# Patient Record
Sex: FEMALE | Race: WHITE | URBAN - METROPOLITAN AREA
[De-identification: names, ages, dates, MRNs, and addresses within clinical notes are randomized per-mention and may not be internally consistent; named-entity substitution may affect disease eponyms.]

---

## 2018-09-25 ENCOUNTER — OUTPATIENT (OUTPATIENT)
Dept: OUTPATIENT SERVICES | Facility: HOSPITAL | Age: 73
LOS: 1 days | Discharge: HOME | End: 2018-09-25

## 2018-09-26 DIAGNOSIS — Z78.0 ASYMPTOMATIC MENOPAUSAL STATE: ICD-10-CM

## 2018-09-26 DIAGNOSIS — Z87.310 PERSONAL HISTORY OF (HEALED) OSTEOPOROSIS FRACTURE: ICD-10-CM

## 2018-09-26 DIAGNOSIS — Z09 ENCOUNTER FOR FOLLOW-UP EXAMINATION AFTER COMPLETED TREATMENT FOR CONDITIONS OTHER THAN MALIGNANT NEOPLASM: ICD-10-CM

## 2018-09-26 DIAGNOSIS — Z82.62 FAMILY HISTORY OF OSTEOPOROSIS: ICD-10-CM

## 2018-09-26 DIAGNOSIS — M81.0 AGE-RELATED OSTEOPOROSIS WITHOUT CURRENT PATHOLOGICAL FRACTURE: ICD-10-CM

## 2019-11-23 ENCOUNTER — EMERGENCY (EMERGENCY)
Facility: HOSPITAL | Age: 74
LOS: 0 days | Discharge: HOME | End: 2019-11-23
Admitting: EMERGENCY MEDICINE
Payer: MEDICARE

## 2019-11-23 VITALS
TEMPERATURE: 98 F | RESPIRATION RATE: 19 BRPM | SYSTOLIC BLOOD PRESSURE: 146 MMHG | OXYGEN SATURATION: 98 % | HEART RATE: 64 BPM | DIASTOLIC BLOOD PRESSURE: 66 MMHG
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
## 2019-11-23 DIAGNOSIS — S69.90XA UNSPECIFIED INJURY OF UNSPECIFIED WRIST, HAND AND FINGER(S), INITIAL ENCOUNTER: ICD-10-CM
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
## 2019-11-23 DIAGNOSIS — S62.627A DISPLACED FRACTURE OF MIDDLE PHALANX OF LEFT LITTLE FINGER, INITIAL ENCOUNTER FOR CLOSED FRACTURE: ICD-10-CM
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
## 2019-11-23 PROCEDURE — 73130 X-RAY EXAM OF HAND: CPT | Mod: 26,LT

## 2019-11-23 PROCEDURE — 29125 APPL SHORT ARM SPLINT STATIC: CPT

## 2019-11-23 PROCEDURE — 99284 EMERGENCY DEPT VISIT MOD MDM: CPT | Mod: 25

## 2019-11-23 NOTE — ED PROVIDER NOTE - PHYSICAL EXAMINATION
VITAL SIGNS: I have reviewed nursing notes and confirm.  CONSTITUTIONAL: Well-developed; well-nourished; in no acute distress.  SKIN: Skin exam is warm and dry, no acute rash.  HEAD: Normocephalic; atraumatic.  EYES: PERRL, EOM intact; conjunctiva and sclera clear.  ENT: No nasal discharge; airway clear. TMs clear.  NECK: Supple; non tender.  CARD: S1, S2 normal; no murmurs, gallops, or rubs. Regular rate and rhythm.  RESP: No wheezes, rales or rhonchi.  ABD: Normal bowel sounds; soft; non-distended; non-tender; no hepatosplenomegaly.  EXT: Normal ROM.   MSK: Tenderness to distal 5th digit of L hand with ecchymosis to L 5th digit and dorsal aspect of L hand at 4th and 5th metacarpal. FROM of hand.   LYMPH: No acute cervical adenopathy.  NEURO: Alert, oriented. Grossly unremarkable. No focal deficits.  PSYCH: Cooperative, appropriate. VITAL SIGNS: I have reviewed nursing notes and confirm.  CONSTITUTIONAL: Well-developed; well-nourished; in no acute distress.  SKIN: See below. Remainder of skin exam is warm and dry, no acute rash.  HEAD: Normocephalic; atraumatic.  EYES: PERRL, EOM intact; conjunctiva and sclera clear.  ENT: No nasal discharge; airway clear.   NECK: Supple; non tender.  CARD: S1, S2 normal; no murmurs, gallops, or rubs. Regular rate and rhythm.  RESP: No wheezes, rales or rhonchi.  EXT: Normal ROM.   MSK: Tenderness to distal 5th digit of L hand with ecchymosis to L 5th digit and dorsal aspect of L hand at 4th and 5th metacarpal. FROM of hand.   NEURO: Alert, oriented. Grossly unremarkable. No focal deficits.  PSYCH: Cooperative, appropriate.

## 2019-11-23 NOTE — ED PROVIDER NOTE - NS ED MD DISPO DISCHARGE CCDA
Patient/Caregiver provided printed discharge information.
Regular rate and rhythm, Heart sounds S1 S2 present, no murmurs, rubs or gallops

## 2019-11-23 NOTE — ED PROVIDER NOTE - OBJECTIVE STATEMENT
75 y/o F no PMH p/w fx to L hand. Pt states on Thursday she tripped and fell and the corner of the stairs hit her 4th and 5th digits displacing her L pinky. States she pushed it back into place. XR done demonstrating fx. Applied tape around 4th and 5th digit. No numbness, tingling, weakness. No decreased ROM. Denies pain. Reports ecchymosis to site. No head injury or LOC at the time of injury. No anticoagulation. 73 y/o F no PMH p/w fx to L hand. Pt states on Thursday she tripped and fell and the corner of the stairs hit her 4th and 5th digits displacing her L pinky. States she pushed it back into place. XR done as outpatient demonstrating fx. Applied tape around 4th and 5th digit prior to arrival. No numbness, tingling, weakness. No decreased ROM. Denies pain. Reports ecchymosis to site. No head injury or LOC at the time of injury. No anticoagulation.

## 2019-11-23 NOTE — ED PROVIDER NOTE - PATIENT PORTAL LINK FT
You can access the FollowMyHealth Patient Portal offered by [facility] by registering at the following website: [email or web address]. By joining [employer]’s FollowMyHealth portal, you will also be able to view your health information using other applications (apps) compatible with our system.

## 2019-11-23 NOTE — ED PROVIDER NOTE - NSFOLLOWUPINSTRUCTIONS_ED_ALL_ED_FT
Finger Fracture, Adult  A finger fracture is a break in any of the finger bones.  What are the causes?  The main cause of finger fractures is injury, such as from sports, a fall, or closing a drawer or door.  What increases the risk?  The following factors may make you more likely to develop this condition:  Playing sports.Workplace activities that involve machinery.Osteoporosis. This condition makes your bones less dense and causes them to break easily.What are the signs or symptoms?  The main symptoms of a fractured finger are pain, bruising, and swelling shortly after the injury. Other symptoms include:  Stiffness.Exposed bones (compound fracture or open fracture), in severe cases.How is this diagnosed?  This condition is diagnosed based on a physical exam, your medical history, and your symptoms. An X-ray will also be done to confirm the diagnosis.  How is this treated?  Treatment for this condition depends on the severity of the fracture. If the bones are still in place, the finger may be splinted to keep the finger still while it heals (immobilization). If the bones are out of place, your health care provider may move them back into place by hand (manually) or surgically.  You may also need to do exercises to regain strength and flexibility (physical therapy) in your finger.  Follow these instructions at home:  If you have a splint:        Wear the splint as told by your health care provider. Remove it only as told by your health care provider.Do not put pressure on any part of the splint until it is fully hardened. This may take several hours.Loosen the splint if your fingers tingle, become numb, or turn cold and blue.Keep the splint clean.If the splint is not waterproof:  Do not let it get wet.Cover it with a watertight covering when you take a bath or a shower.Ask your health care provider when it is safe for you to drive.Managing pain, stiffness, and swelling     If directed, put ice on the injured area:  If you have a removable splint, remove it as told by your health care provider.Put ice in a plastic bag.Place a towel between your skin and the bag.Leave the ice on for 20 minutes, 2–3 times a day.Move your fingers often to avoid stiffness and to lessen swelling.Raise (elevate) the injured area above the level of your heart while you are sitting or lying down.Activity     Do not drive or use heavy machinery while taking prescription pain medicine.Do physical therapy exercises as told by your health care provider.Return to your normal activities as told by your health care provider. Ask your health care provider what activities are safe for you.General instructions     Do not use any products that contain nicotine or tobacco, such as cigarettes and e-cigarettes. These can delay bone healing. If you need help quitting, ask your health care provider.Take over-the-counter and prescription medicines only as told by your health care provider.Keep all follow-up visits as told by your health care provider. This is important.Contact a health care provider if:  Your pain or swelling gets worse, even with treatment.You have trouble moving your finger.Get help right away if:  Your finger becomes numb or blue.Summary  A finger fracture is a break in any of the finger bones.Injury is the main cause of finger fractures.Treatment for this condition depends on the severity of the fracture.This information is not intended to replace advice given to you by your health care provider. Make sure you discuss any questions you have with your health care provider.

## 2019-11-23 NOTE — ED PROVIDER NOTE - PROGRESS NOTE DETAILS
Pt with XR showing nondisplaced fx of middle phalanx of 5th finger with slight subluxation. Ulnar gutter splint applied. Advised ortho f/u.

## 2019-11-23 NOTE — ED PROVIDER NOTE - CARE PROVIDER_API CALL
Kain Carter)  Orthopaedic Surgery  [address] [place] [address]  Phone: [phone]  Fax: [phone]  Follow Up Time: 1-3 Days

## 2019-11-23 NOTE — ED PROVIDER NOTE - CLINICAL SUMMARY MEDICAL DECISION MAKING FREE TEXT BOX
Pt with XR showing nondisplaced fx of middle phalanx of 5th finger with slight subluxation. Ulnar gutter splint applied. Advised ortho f/u. I have discussed the discharge plan with the patient. The patient agrees with the plan, as discussed.  The patient understands Emergency Department diagnosis is a preliminary diagnosis often based on limited information and that the patient must adhere to the follow-up plan as discussed.  The patient understands that if the symptoms worsen or if prescribed medications do not have the desired/planned effect that the patient may return to the Emergency Department at any time for further evaluation and treatment.

## 2019-11-23 NOTE — ED PROVIDER NOTE - NS ED ROS FT
Review of Systems:  CONSTITUTIONAL - no fever, no diaphoresis, no chills  SKIN - (+) bruising  HEMATOLOGIC - no bleeding, no bruising  RESPIRATORY - no shortness of breath, no cough  CARDIAC - no chest pain, no palpitations  MUSCULOSKELETAL - (+) finger injury  NEUROLOGIC - no weakness, no headache, no paresthesias, no LOC  PSYCH - no anxiety, non suicidal, non homicidal, no hallucination, no depression  All other ROS are negative except as documented in HPI. Review of Systems:  CONSTITUTIONAL - no fever, no diaphoresis, no chills  SKIN - (+) bruising  HEMATOLOGIC - no bleeding, no bruising  RESPIRATORY - no shortness of breath, no cough  CARDIAC - no chest pain, no palpitations  MUSCULOSKELETAL - (+) finger injury  NEUROLOGIC - no weakness, no headache, no paresthesias, no LOC  All other ROS are negative except as documented in HPI.

## 2021-11-23 ENCOUNTER — OUTPATIENT (OUTPATIENT)
Dept: OUTPATIENT SERVICES | Facility: HOSPITAL | Age: 76
LOS: 1 days | Discharge: HOME | End: 2021-11-23

## 2021-11-24 DIAGNOSIS — Z78.0 ASYMPTOMATIC MENOPAUSAL STATE: ICD-10-CM

## 2021-11-24 DIAGNOSIS — Z82.62 FAMILY HISTORY OF OSTEOPOROSIS: ICD-10-CM

## 2021-11-24 DIAGNOSIS — M81.0 AGE-RELATED OSTEOPOROSIS WITHOUT CURRENT PATHOLOGICAL FRACTURE: ICD-10-CM

## 2021-11-24 DIAGNOSIS — Z13.820 ENCOUNTER FOR SCREENING FOR OSTEOPOROSIS: ICD-10-CM

## 2021-11-24 DIAGNOSIS — Z87.310 PERSONAL HISTORY OF (HEALED) OSTEOPOROSIS FRACTURE: ICD-10-CM

## 2022-02-08 ENCOUNTER — RESULT REVIEW (OUTPATIENT)
Age: 77
End: 2022-02-08

## 2022-02-08 ENCOUNTER — OUTPATIENT (OUTPATIENT)
Dept: OUTPATIENT SERVICES | Facility: HOSPITAL | Age: 77
LOS: 1 days | Discharge: HOME | End: 2022-02-08
Payer: MEDICARE

## 2022-02-08 VITALS
WEIGHT: 136.03 LBS | SYSTOLIC BLOOD PRESSURE: 154 MMHG | HEIGHT: 62 IN | RESPIRATION RATE: 16 BRPM | TEMPERATURE: 97 F | DIASTOLIC BLOOD PRESSURE: 77 MMHG | OXYGEN SATURATION: 98 % | HEART RATE: 87 BPM

## 2022-02-08 DIAGNOSIS — M17.12 UNILATERAL PRIMARY OSTEOARTHRITIS, LEFT KNEE: ICD-10-CM

## 2022-02-08 DIAGNOSIS — Z01.818 ENCOUNTER FOR OTHER PREPROCEDURAL EXAMINATION: ICD-10-CM

## 2022-02-08 DIAGNOSIS — Z98.890 OTHER SPECIFIED POSTPROCEDURAL STATES: Chronic | ICD-10-CM

## 2022-02-08 DIAGNOSIS — Z90.49 ACQUIRED ABSENCE OF OTHER SPECIFIED PARTS OF DIGESTIVE TRACT: Chronic | ICD-10-CM

## 2022-02-08 PROBLEM — Z00.00 ENCOUNTER FOR PREVENTIVE HEALTH EXAMINATION: Status: ACTIVE | Noted: 2022-02-08

## 2022-02-08 LAB
A1C WITH ESTIMATED AVERAGE GLUCOSE RESULT: 5.3 % — SIGNIFICANT CHANGE UP (ref 4–5.6)
ALBUMIN SERPL ELPH-MCNC: 4.5 G/DL — SIGNIFICANT CHANGE UP (ref 3.5–5.2)
ALP SERPL-CCNC: 55 U/L — SIGNIFICANT CHANGE UP (ref 30–115)
ALT FLD-CCNC: 10 U/L — SIGNIFICANT CHANGE UP (ref 0–41)
ANION GAP SERPL CALC-SCNC: 14 MMOL/L — SIGNIFICANT CHANGE UP (ref 7–14)
APTT BLD: 37.1 SEC — SIGNIFICANT CHANGE UP (ref 27–39.2)
AST SERPL-CCNC: 16 U/L — SIGNIFICANT CHANGE UP (ref 0–41)
BASOPHILS # BLD AUTO: 0.03 K/UL — SIGNIFICANT CHANGE UP (ref 0–0.2)
BASOPHILS NFR BLD AUTO: 0.6 % — SIGNIFICANT CHANGE UP (ref 0–1)
BILIRUB SERPL-MCNC: 0.6 MG/DL — SIGNIFICANT CHANGE UP (ref 0.2–1.2)
BLD GP AB SCN SERPL QL: SIGNIFICANT CHANGE UP
BUN SERPL-MCNC: 14 MG/DL — SIGNIFICANT CHANGE UP (ref 10–20)
CALCIUM SERPL-MCNC: 9.5 MG/DL — SIGNIFICANT CHANGE UP (ref 8.5–10.1)
CHLORIDE SERPL-SCNC: 104 MMOL/L — SIGNIFICANT CHANGE UP (ref 98–110)
CO2 SERPL-SCNC: 24 MMOL/L — SIGNIFICANT CHANGE UP (ref 17–32)
CREAT SERPL-MCNC: 0.7 MG/DL — SIGNIFICANT CHANGE UP (ref 0.7–1.5)
EOSINOPHIL # BLD AUTO: 0.04 K/UL — SIGNIFICANT CHANGE UP (ref 0–0.7)
EOSINOPHIL NFR BLD AUTO: 0.8 % — SIGNIFICANT CHANGE UP (ref 0–8)
ESTIMATED AVERAGE GLUCOSE: 105 MG/DL — SIGNIFICANT CHANGE UP (ref 68–114)
GLUCOSE SERPL-MCNC: 87 MG/DL — SIGNIFICANT CHANGE UP (ref 70–99)
HCT VFR BLD CALC: 39.5 % — SIGNIFICANT CHANGE UP (ref 37–47)
HGB BLD-MCNC: 13.1 G/DL — SIGNIFICANT CHANGE UP (ref 12–16)
IMM GRANULOCYTES NFR BLD AUTO: 0.2 % — SIGNIFICANT CHANGE UP (ref 0.1–0.3)
INR BLD: 0.98 RATIO — SIGNIFICANT CHANGE UP (ref 0.65–1.3)
LYMPHOCYTES # BLD AUTO: 1.75 K/UL — SIGNIFICANT CHANGE UP (ref 1.2–3.4)
LYMPHOCYTES # BLD AUTO: 36.1 % — SIGNIFICANT CHANGE UP (ref 20.5–51.1)
MCHC RBC-ENTMCNC: 31.3 PG — HIGH (ref 27–31)
MCHC RBC-ENTMCNC: 33.2 G/DL — SIGNIFICANT CHANGE UP (ref 32–37)
MCV RBC AUTO: 94.5 FL — SIGNIFICANT CHANGE UP (ref 81–99)
MONOCYTES # BLD AUTO: 0.44 K/UL — SIGNIFICANT CHANGE UP (ref 0.1–0.6)
MONOCYTES NFR BLD AUTO: 9.1 % — SIGNIFICANT CHANGE UP (ref 1.7–9.3)
MRSA PCR RESULT.: NEGATIVE — SIGNIFICANT CHANGE UP
NEUTROPHILS # BLD AUTO: 2.58 K/UL — SIGNIFICANT CHANGE UP (ref 1.4–6.5)
NEUTROPHILS NFR BLD AUTO: 53.2 % — SIGNIFICANT CHANGE UP (ref 42.2–75.2)
NRBC # BLD: 0 /100 WBCS — SIGNIFICANT CHANGE UP (ref 0–0)
PLATELET # BLD AUTO: 302 K/UL — SIGNIFICANT CHANGE UP (ref 130–400)
POTASSIUM SERPL-MCNC: 4 MMOL/L — SIGNIFICANT CHANGE UP (ref 3.5–5)
POTASSIUM SERPL-SCNC: 4 MMOL/L — SIGNIFICANT CHANGE UP (ref 3.5–5)
PROT SERPL-MCNC: 7 G/DL — SIGNIFICANT CHANGE UP (ref 6–8)
PROTHROM AB SERPL-ACNC: 11.3 SEC — SIGNIFICANT CHANGE UP (ref 9.95–12.87)
RBC # BLD: 4.18 M/UL — LOW (ref 4.2–5.4)
RBC # FLD: 12.8 % — SIGNIFICANT CHANGE UP (ref 11.5–14.5)
SODIUM SERPL-SCNC: 142 MMOL/L — SIGNIFICANT CHANGE UP (ref 135–146)
WBC # BLD: 4.85 K/UL — SIGNIFICANT CHANGE UP (ref 4.8–10.8)
WBC # FLD AUTO: 4.85 K/UL — SIGNIFICANT CHANGE UP (ref 4.8–10.8)

## 2022-02-08 PROCEDURE — 73562 X-RAY EXAM OF KNEE 3: CPT | Mod: 26,LT

## 2022-02-08 PROCEDURE — 93010 ELECTROCARDIOGRAM REPORT: CPT

## 2022-02-08 PROCEDURE — 71046 X-RAY EXAM CHEST 2 VIEWS: CPT | Mod: 26

## 2022-02-08 PROCEDURE — 72170 X-RAY EXAM OF PELVIS: CPT | Mod: 26

## 2022-02-08 NOTE — H&P PST ADULT - HISTORY OF PRESENT ILLNESS
Pt states for many years she has had arthritis in left knee. Pain started to increase and get worse during summer 2021. Complains of intermittent stabbing pain rated 10/10 that is worse with ambulation or long periods of standing/sitting. Admits to Tylenol use for pain but denies getting much relief. Now for listed procedure.    Denies any chest pain, difficulty breathing, SOB, palpitations, dysuria, URI, or any other infections in the last 2 weeks. Denies any recent travel, contact, or exposure to any persons with known or suspected COVID-19. Pt also denies COVID testing within the last 2 weeks. Pt admits to receiving all doses of Moderna COVID vaccine. Denies any suicidal or homicidal ideations. Pt advised to self quarantine until day of procedure. Exercise tolerance of 1 flight of stairs without dyspnea. JAX reviewed with patient. Pt verbalized understanding of all pre-operative instructions.    Anesthesia Alert  NO--Difficult Airway  NO--History of neck surgery or radiation  NO--Limited ROM of neck  NO--History of Malignant hyperthermia  NO--No personal or family history of Pseudocholinesterase deficiency.  NO--Prior Anesthesia Complication  NO--Latex Allergy  NO--Loose teeth  NO--History of Rheumatoid Arthritis  NO--JAX  NO--Bleeding Risk  NO--Other_____

## 2022-02-08 NOTE — H&P PST ADULT - HEIGHT IN FEET
Spoke with Rachana yooing  a perscription that has been called in for ferrous sulfate due to low HgB per Dr. Vinson. She will be picking up the perscription tomorrow.       ----- Message from Kacy Jacobs RN sent at 10/6/2021 11:46 AM EDT -----  Regarding: FW: iron  Will you please let her know this and let her know we are calling in a Rx for iron and will recheck her in 3 weeks    Thanks  ----- Message -----  From: MYAH Vinson MD  Sent: 10/6/2021  11:15 AM EDT  To: Kacy Jacobs RN  Subject: iron                                             This is a new lady I saw today for follicular lymphoma. Please let her know that her HgB is still low (~8.7) following her surgery with Dr. Elkins and give her a Rx for ferrous sulfate 325 mg PO BID. We'll recheck her labs at her fu in 3 weeks. Thanks.         5

## 2022-02-08 NOTE — H&P PST ADULT - REASON FOR ADMISSION
77 yo female presents for PAST in preparation for left knee unicondylar arthroplasty on 2/23/2022 under regional anesthesia by Dr. Hughes ([facility]).

## 2022-02-08 NOTE — H&P PST ADULT - NSICDXPASTMEDICALHX_GEN_ALL_CORE_FT
PAST MEDICAL HISTORY:  Anxiety     Colon cancer     GERD (gastroesophageal reflux disease)     High cholesterol

## 2022-02-08 NOTE — H&P PST ADULT - NSANTHOSAYNRD_GEN_A_CORE
No. JAX screening performed.  STOP BANG Legend: 0-2 = LOW Risk; 3-4 = INTERMEDIATE Risk; 5-8 = HIGH Risk

## 2022-02-23 ENCOUNTER — OUTPATIENT (OUTPATIENT)
Dept: OUTPATIENT SERVICES | Facility: HOSPITAL | Age: 77
LOS: 1 days | Discharge: HOME | End: 2022-02-23

## 2022-02-23 ENCOUNTER — RESULT REVIEW (OUTPATIENT)
Age: 77
End: 2022-02-23

## 2022-02-23 ENCOUNTER — INPATIENT (INPATIENT)
Facility: HOSPITAL | Age: 77
LOS: 0 days | Discharge: HOME | End: 2022-02-24
Attending: ORTHOPAEDIC SURGERY | Admitting: ORTHOPAEDIC SURGERY
Payer: MEDICARE

## 2022-02-23 ENCOUNTER — TRANSCRIPTION ENCOUNTER (OUTPATIENT)
Age: 77
End: 2022-02-23

## 2022-02-23 VITALS
HEART RATE: 75 BPM | WEIGHT: 139.99 LBS | TEMPERATURE: 98 F | SYSTOLIC BLOOD PRESSURE: 140 MMHG | RESPIRATION RATE: 17 BRPM | HEIGHT: 62 IN | DIASTOLIC BLOOD PRESSURE: 67 MMHG | OXYGEN SATURATION: 100 %

## 2022-02-23 DIAGNOSIS — Z90.49 ACQUIRED ABSENCE OF OTHER SPECIFIED PARTS OF DIGESTIVE TRACT: Chronic | ICD-10-CM

## 2022-02-23 DIAGNOSIS — D62 ACUTE POSTHEMORRHAGIC ANEMIA: ICD-10-CM

## 2022-02-23 DIAGNOSIS — M17.12 UNILATERAL PRIMARY OSTEOARTHRITIS, LEFT KNEE: ICD-10-CM

## 2022-02-23 DIAGNOSIS — M17.10 UNILATERAL PRIMARY OSTEOARTHRITIS, UNSPECIFIED KNEE: ICD-10-CM

## 2022-02-23 DIAGNOSIS — Z98.890 OTHER SPECIFIED POSTPROCEDURAL STATES: Chronic | ICD-10-CM

## 2022-02-23 LAB
GLUCOSE BLDC GLUCOMTR-MCNC: 85 MG/DL — SIGNIFICANT CHANGE UP (ref 70–99)
GLUCOSE BLDC GLUCOMTR-MCNC: 91 MG/DL — SIGNIFICANT CHANGE UP (ref 70–99)

## 2022-02-23 PROCEDURE — 73560 X-RAY EXAM OF KNEE 1 OR 2: CPT | Mod: 26,LT

## 2022-02-23 PROCEDURE — 88305 TISSUE EXAM BY PATHOLOGIST: CPT | Mod: 26

## 2022-02-23 PROCEDURE — 88311 DECALCIFY TISSUE: CPT | Mod: 26

## 2022-02-23 RX ORDER — CELECOXIB 200 MG/1
200 CAPSULE ORAL EVERY 12 HOURS
Refills: 0 | Status: DISCONTINUED | OUTPATIENT
Start: 2022-02-24 | End: 2022-02-24

## 2022-02-23 RX ORDER — SENNA PLUS 8.6 MG/1
2 TABLET ORAL AT BEDTIME
Refills: 0 | Status: DISCONTINUED | OUTPATIENT
Start: 2022-02-23 | End: 2022-02-24

## 2022-02-23 RX ORDER — SODIUM CHLORIDE 9 MG/ML
1000 INJECTION, SOLUTION INTRAVENOUS
Refills: 0 | Status: DISCONTINUED | OUTPATIENT
Start: 2022-02-23 | End: 2022-02-23

## 2022-02-23 RX ORDER — FLUTICASONE PROPIONATE 50 MCG
1 SPRAY, SUSPENSION NASAL
Refills: 0 | Status: DISCONTINUED | OUTPATIENT
Start: 2022-02-23 | End: 2022-02-24

## 2022-02-23 RX ORDER — PANTOPRAZOLE SODIUM 20 MG/1
1 TABLET, DELAYED RELEASE ORAL
Qty: 0 | Refills: 0 | DISCHARGE

## 2022-02-23 RX ORDER — LEVOCETIRIZINE DIHYDROCHLORIDE 0.5 MG/ML
1 SOLUTION ORAL
Qty: 0 | Refills: 0 | DISCHARGE

## 2022-02-23 RX ORDER — HYDROMORPHONE HYDROCHLORIDE 2 MG/ML
1 INJECTION INTRAMUSCULAR; INTRAVENOUS; SUBCUTANEOUS
Refills: 0 | Status: DISCONTINUED | OUTPATIENT
Start: 2022-02-23 | End: 2022-02-23

## 2022-02-23 RX ORDER — SODIUM CHLORIDE 9 MG/ML
1000 INJECTION INTRAMUSCULAR; INTRAVENOUS; SUBCUTANEOUS
Refills: 0 | Status: DISCONTINUED | OUTPATIENT
Start: 2022-02-23 | End: 2022-02-24

## 2022-02-23 RX ORDER — LORATADINE 10 MG/1
10 TABLET ORAL DAILY
Refills: 0 | Status: DISCONTINUED | OUTPATIENT
Start: 2022-02-23 | End: 2022-02-24

## 2022-02-23 RX ORDER — HYDROMORPHONE HYDROCHLORIDE 2 MG/ML
0.5 INJECTION INTRAMUSCULAR; INTRAVENOUS; SUBCUTANEOUS
Refills: 0 | Status: DISCONTINUED | OUTPATIENT
Start: 2022-02-23 | End: 2022-02-23

## 2022-02-23 RX ORDER — FLUTICASONE PROPIONATE 50 MCG
1 SPRAY, SUSPENSION NASAL
Qty: 0 | Refills: 0 | DISCHARGE

## 2022-02-23 RX ORDER — TRAMADOL HYDROCHLORIDE 50 MG/1
50 TABLET ORAL EVERY 4 HOURS
Refills: 0 | Status: DISCONTINUED | OUTPATIENT
Start: 2022-02-23 | End: 2022-02-24

## 2022-02-23 RX ORDER — ACETAMINOPHEN 500 MG
1000 TABLET ORAL ONCE
Refills: 0 | Status: COMPLETED | OUTPATIENT
Start: 2022-02-23 | End: 2022-02-23

## 2022-02-23 RX ORDER — ATORVASTATIN CALCIUM 80 MG/1
1 TABLET, FILM COATED ORAL
Qty: 0 | Refills: 0 | DISCHARGE

## 2022-02-23 RX ORDER — PANTOPRAZOLE SODIUM 20 MG/1
40 TABLET, DELAYED RELEASE ORAL
Refills: 0 | Status: DISCONTINUED | OUTPATIENT
Start: 2022-02-23 | End: 2022-02-24

## 2022-02-23 RX ORDER — CEFAZOLIN SODIUM 1 G
2000 VIAL (EA) INJECTION EVERY 8 HOURS
Refills: 0 | Status: COMPLETED | OUTPATIENT
Start: 2022-02-23 | End: 2022-02-24

## 2022-02-23 RX ORDER — ALPRAZOLAM 0.25 MG
1 TABLET ORAL
Qty: 0 | Refills: 0 | DISCHARGE

## 2022-02-23 RX ORDER — ATORVASTATIN CALCIUM 80 MG/1
10 TABLET, FILM COATED ORAL AT BEDTIME
Refills: 0 | Status: DISCONTINUED | OUTPATIENT
Start: 2022-02-23 | End: 2022-02-24

## 2022-02-23 RX ORDER — MEPERIDINE HYDROCHLORIDE 50 MG/ML
12.5 INJECTION INTRAMUSCULAR; INTRAVENOUS; SUBCUTANEOUS
Refills: 0 | Status: DISCONTINUED | OUTPATIENT
Start: 2022-02-23 | End: 2022-02-23

## 2022-02-23 RX ORDER — ASPIRIN/CALCIUM CARB/MAGNESIUM 324 MG
81 TABLET ORAL
Refills: 0 | Status: DISCONTINUED | OUTPATIENT
Start: 2022-02-24 | End: 2022-02-24

## 2022-02-23 RX ORDER — ACETAMINOPHEN 500 MG
650 TABLET ORAL EVERY 6 HOURS
Refills: 0 | Status: DISCONTINUED | OUTPATIENT
Start: 2022-02-23 | End: 2022-02-24

## 2022-02-23 RX ORDER — KETOROLAC TROMETHAMINE 30 MG/ML
15 SYRINGE (ML) INJECTION EVERY 6 HOURS
Refills: 0 | Status: DISCONTINUED | OUTPATIENT
Start: 2022-02-23 | End: 2022-02-24

## 2022-02-23 RX ORDER — ONDANSETRON 8 MG/1
4 TABLET, FILM COATED ORAL ONCE
Refills: 0 | Status: DISCONTINUED | OUTPATIENT
Start: 2022-02-23 | End: 2022-02-23

## 2022-02-23 RX ORDER — ONDANSETRON 8 MG/1
4 TABLET, FILM COATED ORAL EVERY 6 HOURS
Refills: 0 | Status: DISCONTINUED | OUTPATIENT
Start: 2022-02-23 | End: 2022-02-24

## 2022-02-23 RX ORDER — CHLORHEXIDINE GLUCONATE 213 G/1000ML
1 SOLUTION TOPICAL
Refills: 0 | Status: DISCONTINUED | OUTPATIENT
Start: 2022-02-23 | End: 2022-02-24

## 2022-02-23 RX ORDER — CELECOXIB 200 MG/1
200 CAPSULE ORAL ONCE
Refills: 0 | Status: COMPLETED | OUTPATIENT
Start: 2022-02-23 | End: 2022-02-23

## 2022-02-23 RX ADMIN — Medication 650 MILLIGRAM(S): at 17:36

## 2022-02-23 RX ADMIN — SODIUM CHLORIDE 100 MILLILITER(S): 9 INJECTION, SOLUTION INTRAVENOUS at 13:48

## 2022-02-23 RX ADMIN — TRAMADOL HYDROCHLORIDE 50 MILLIGRAM(S): 50 TABLET ORAL at 20:28

## 2022-02-23 RX ADMIN — SENNA PLUS 2 TABLET(S): 8.6 TABLET ORAL at 21:33

## 2022-02-23 RX ADMIN — Medication 650 MILLIGRAM(S): at 23:58

## 2022-02-23 RX ADMIN — Medication 1 SPRAY(S): at 18:04

## 2022-02-23 RX ADMIN — TRAMADOL HYDROCHLORIDE 50 MILLIGRAM(S): 50 TABLET ORAL at 21:29

## 2022-02-23 RX ADMIN — Medication 650 MILLIGRAM(S): at 17:57

## 2022-02-23 RX ADMIN — Medication 15 MILLIGRAM(S): at 23:58

## 2022-02-23 RX ADMIN — Medication 15 MILLIGRAM(S): at 17:36

## 2022-02-23 RX ADMIN — Medication 15 MILLIGRAM(S): at 17:57

## 2022-02-23 RX ADMIN — ATORVASTATIN CALCIUM 10 MILLIGRAM(S): 80 TABLET, FILM COATED ORAL at 21:49

## 2022-02-23 RX ADMIN — Medication 100 MILLIGRAM(S): at 18:05

## 2022-02-23 RX ADMIN — SODIUM CHLORIDE 100 MILLILITER(S): 9 INJECTION INTRAMUSCULAR; INTRAVENOUS; SUBCUTANEOUS at 17:36

## 2022-02-23 RX ADMIN — CELECOXIB 200 MILLIGRAM(S): 200 CAPSULE ORAL at 08:23

## 2022-02-23 RX ADMIN — Medication 1000 MILLIGRAM(S): at 08:21

## 2022-02-23 NOTE — DISCHARGE NOTE PROVIDER - CARE PROVIDER_API CALL
Brian Claros)  Orthopaedic Surgery  [address] [place] [address]  Phone: [phone]  Fax: [phone]  Established Patient  Follow Up Time: Routine

## 2022-02-23 NOTE — DISCHARGE NOTE PROVIDER - NSDCFUADDINST_GEN_ALL_CORE_FT
remove ace bandage on day 2  maintain aquacel dressing for 7 days   after aquacel dressing removed:   keep the incision clean and dry   do not apply any creams or lotions to the incision site  showering may occur on post op day 2  any surgical site drainage please notify your surgeon

## 2022-02-23 NOTE — DISCHARGE NOTE PROVIDER - NSDCCPTREATMENT_GEN_ALL_CORE_FT
PRINCIPAL PROCEDURE  Procedure: Open partial replacement of knee joint  Findings and Treatment:

## 2022-02-23 NOTE — DISCHARGE NOTE PROVIDER - NSDCMRMEDTOKEN_GEN_ALL_CORE_FT
ALPRAZolam 0.25 mg oral tablet: 1 tab(s) orally 3 times a day, As Needed  atorvastatin 10 mg oral tablet: 1 tab(s) orally once a day  fluticasone 50 mcg/inh nasal spray: 1 spray(s) nasal 2 times a day  levocetirizine 5 mg oral tablet: 1 tab(s) orally once a day (in the evening)  pantoprazole 40 mg oral delayed release tablet: 1 tab(s) orally once a day   acetaminophen 325 mg oral tablet: 2 tab(s) orally every 6 hours MDD:8  ALPRAZolam 0.25 mg oral tablet: 1 tab(s) orally 3 times a day, As Needed  aspirin 81 mg oral delayed release tablet: 1 tab(s) orally 2 times a day MDD:2  atorvastatin 10 mg oral tablet: 1 tab(s) orally once a day  celecoxib 200 mg oral capsule: 1 cap(s) orally every 12 hours MDD:2  fluticasone 50 mcg/inh nasal spray: 1 spray(s) nasal 2 times a day  levocetirizine 5 mg oral tablet: 1 tab(s) orally once a day (in the evening)  oxyCODONE 5 mg oral capsule: 1 cap(s) orally every 8 hours MDD:3  pantoprazole 40 mg oral delayed release tablet: 1 tab(s) orally once a day  traMADol 50 mg oral tablet: 1 tab(s) orally every 6 hours, As Needed -Mild Pain (1 - 3) MDD:4

## 2022-02-23 NOTE — BRIEF OPERATIVE NOTE - COMMENTS
Louis & Natalie zuch uni  vanco powder placed above and below the fascia total 2 grams    wbat pain cocktail given asa for dvt prophylaxis   tq time 75min

## 2022-02-23 NOTE — DISCHARGE NOTE PROVIDER - HOSPITAL COURSE
routine post op course s/p partial knee replacement 76y Female underwent a unicompartmental knee arthroplasty on 2/23/22. Patient tolerated surgery well with no intra/post operative complications. Patient was given intra/post operative antibiotics for infection prophylaxis. Patient will be discharged on aspirin 81mg BID x30 days to prevent blood clots. Patient worked with Physical Therapy while admitted to the hospital. Patient is stable for discharge.

## 2022-02-23 NOTE — CHART NOTE - NSCHARTNOTEFT_GEN_A_CORE
PACU ANESTHESIA ADMISSION NOTE      Procedure: Open partial replacement of knee joint      Post op diagnosis:  OA (osteoarthritis) of knee        ____  Intubated  TV:______       Rate: ______      FiO2: ______    __x__  Patent Airway    __x__  Full return of protective reflexes    ____  Full recovery from anesthesia / back to baseline     Vitals:   T:    97.5       R:   18               BP:  110/68               Sat:    95               P:  72      Mental Status:  __x__ Awake   __x___ Alert   _____ Drowsy   _____ Sedated    Nausea/Vomiting:  __x__ NO  ______Yes,   See Post - Op Orders          Pain Scale (0-10):  __0___    Treatment: ____ None    ____ See Post - Op/PCA Orders    Post - Operative Fluids:   ____ Oral   _x___ See Post - Op Orders    Plan: Discharge:   ____Home       _x____Floor     _____Critical Care    _____  Other:_________________    Comments:  d/c when meets criteria

## 2022-02-23 NOTE — PATIENT PROFILE ADULT - FALL HARM RISK - HARM RISK INTERVENTIONS
Assistance with ambulation/Assistance OOB with selected safe patient handling equipment/Communicate Risk of Fall with Harm to all staff/Discuss with provider need for PT consult/Monitor gait and stability/Provide patient with walking aids - walker, cane, crutches/Reinforce activity limits and safety measures with patient and family/Sit up slowly, dangle for a short time, stand at bedside before walking/Tailored Fall Risk Interventions/Use of alarms - bed, chair and/or voice tab/Visual Cue: Yellow wristband and red socks/Bed in lowest position, wheels locked, appropriate side rails in place/Call bell, personal items and telephone in reach/Instruct patient to call for assistance before getting out of bed or chair/Non-slip footwear when patient is out of bed/[place] to call system/Physically safe environment - no spills, clutter or unnecessary equipment/Purposeful Proactive Rounding/Room/bathroom lighting operational, light cord in reach

## 2022-02-23 NOTE — DISCHARGE NOTE PROVIDER - CARE PROVIDERS DIRECT ADDRESSES
,[email or web address]

## 2022-02-23 NOTE — ASU PATIENT PROFILE, ADULT - FALL HARM RISK - UNIVERSAL INTERVENTIONS
Bed in lowest position, wheels locked, appropriate side rails in place/Call bell, personal items and telephone in reach/Instruct patient to call for assistance before getting out of bed or chair/Non-slip footwear when patient is out of bed/[place] to call system/Physically safe environment - no spills, clutter or unnecessary equipment/Purposeful Proactive Rounding/Room/bathroom lighting operational, light cord in reach

## 2022-02-23 NOTE — PHYSICAL THERAPY INITIAL EVALUATION ADULT - LIVES WITH, PROFILE
Pt lives with  in an apartment with 5 steps to enter with b/l handrails and level floor inside./spouse

## 2022-02-23 NOTE — PHYSICAL THERAPY INITIAL EVALUATION ADULT - GENERAL OBSERVATIONS, REHAB EVAL
16:20-16:45. Chart reviewed; confirmed with RN to see the pt for PT. Pt ready for PT; received in bed in Semi-Winston's position with no complain of pain and in NAD. +ace bandage L LE + IV L UE, +tele, +bp cuff, +sequential, +pulse ox. Pt agreeable for PT evaluation.

## 2022-02-24 ENCOUNTER — TRANSCRIPTION ENCOUNTER (OUTPATIENT)
Age: 77
End: 2022-02-24

## 2022-02-24 VITALS
TEMPERATURE: 96 F | DIASTOLIC BLOOD PRESSURE: 75 MMHG | RESPIRATION RATE: 18 BRPM | HEART RATE: 80 BPM | SYSTOLIC BLOOD PRESSURE: 141 MMHG

## 2022-02-24 LAB
ANION GAP SERPL CALC-SCNC: 8 MMOL/L — SIGNIFICANT CHANGE UP (ref 7–14)
BUN SERPL-MCNC: 10 MG/DL — SIGNIFICANT CHANGE UP (ref 10–20)
CALCIUM SERPL-MCNC: 8.6 MG/DL — SIGNIFICANT CHANGE UP (ref 8.5–10.1)
CHLORIDE SERPL-SCNC: 109 MMOL/L — SIGNIFICANT CHANGE UP (ref 98–110)
CO2 SERPL-SCNC: 23 MMOL/L — SIGNIFICANT CHANGE UP (ref 17–32)
CREAT SERPL-MCNC: 0.6 MG/DL — LOW (ref 0.7–1.5)
GLUCOSE BLDC GLUCOMTR-MCNC: 90 MG/DL — SIGNIFICANT CHANGE UP (ref 70–99)
GLUCOSE SERPL-MCNC: 100 MG/DL — HIGH (ref 70–99)
HCT VFR BLD CALC: 33.4 % — LOW (ref 37–47)
HGB BLD-MCNC: 11 G/DL — LOW (ref 12–16)
MCHC RBC-ENTMCNC: 31.3 PG — HIGH (ref 27–31)
MCHC RBC-ENTMCNC: 32.9 G/DL — SIGNIFICANT CHANGE UP (ref 32–37)
MCV RBC AUTO: 94.9 FL — SIGNIFICANT CHANGE UP (ref 81–99)
NRBC # BLD: 0 /100 WBCS — SIGNIFICANT CHANGE UP (ref 0–0)
PLATELET # BLD AUTO: 216 K/UL — SIGNIFICANT CHANGE UP (ref 130–400)
POTASSIUM SERPL-MCNC: 4.4 MMOL/L — SIGNIFICANT CHANGE UP (ref 3.5–5)
POTASSIUM SERPL-SCNC: 4.4 MMOL/L — SIGNIFICANT CHANGE UP (ref 3.5–5)
RBC # BLD: 3.52 M/UL — LOW (ref 4.2–5.4)
RBC # FLD: 12.6 % — SIGNIFICANT CHANGE UP (ref 11.5–14.5)
SODIUM SERPL-SCNC: 140 MMOL/L — SIGNIFICANT CHANGE UP (ref 135–146)
WBC # BLD: 8.95 K/UL — SIGNIFICANT CHANGE UP (ref 4.8–10.8)
WBC # FLD AUTO: 8.95 K/UL — SIGNIFICANT CHANGE UP (ref 4.8–10.8)

## 2022-02-24 PROCEDURE — 99231 SBSQ HOSP IP/OBS SF/LOW 25: CPT

## 2022-02-24 RX ORDER — OXYCODONE HYDROCHLORIDE 5 MG/1
1 TABLET ORAL
Qty: 12 | Refills: 0
Start: 2022-02-24 | End: 2022-02-27

## 2022-02-24 RX ORDER — ACETAMINOPHEN 500 MG
2 TABLET ORAL
Qty: 112 | Refills: 0
Start: 2022-02-24 | End: 2022-03-09

## 2022-02-24 RX ORDER — ALPRAZOLAM 0.25 MG
0.5 TABLET ORAL AT BEDTIME
Refills: 0 | Status: DISCONTINUED | OUTPATIENT
Start: 2022-02-24 | End: 2022-02-24

## 2022-02-24 RX ORDER — ASPIRIN/CALCIUM CARB/MAGNESIUM 324 MG
1 TABLET ORAL
Qty: 60 | Refills: 0
Start: 2022-02-24 | End: 2022-03-25

## 2022-02-24 RX ORDER — TRAMADOL HYDROCHLORIDE 50 MG/1
1 TABLET ORAL
Qty: 20 | Refills: 0
Start: 2022-02-24 | End: 2022-02-28

## 2022-02-24 RX ORDER — CELECOXIB 200 MG/1
1 CAPSULE ORAL
Qty: 28 | Refills: 0
Start: 2022-02-24 | End: 2022-03-09

## 2022-02-24 RX ADMIN — Medication 15 MILLIGRAM(S): at 12:24

## 2022-02-24 RX ADMIN — Medication 100 MILLIGRAM(S): at 03:18

## 2022-02-24 RX ADMIN — Medication 15 MILLIGRAM(S): at 05:45

## 2022-02-24 RX ADMIN — Medication 81 MILLIGRAM(S): at 05:46

## 2022-02-24 RX ADMIN — PANTOPRAZOLE SODIUM 40 MILLIGRAM(S): 20 TABLET, DELAYED RELEASE ORAL at 05:46

## 2022-02-24 RX ADMIN — Medication 650 MILLIGRAM(S): at 05:53

## 2022-02-24 RX ADMIN — TRAMADOL HYDROCHLORIDE 50 MILLIGRAM(S): 50 TABLET ORAL at 09:27

## 2022-02-24 RX ADMIN — Medication 15 MILLIGRAM(S): at 00:24

## 2022-02-24 RX ADMIN — CELECOXIB 200 MILLIGRAM(S): 200 CAPSULE ORAL at 10:44

## 2022-02-24 RX ADMIN — Medication 15 MILLIGRAM(S): at 05:53

## 2022-02-24 RX ADMIN — Medication 0.5 MILLIGRAM(S): at 00:39

## 2022-02-24 RX ADMIN — CELECOXIB 200 MILLIGRAM(S): 200 CAPSULE ORAL at 09:29

## 2022-02-24 RX ADMIN — Medication 650 MILLIGRAM(S): at 05:46

## 2022-02-24 RX ADMIN — TRAMADOL HYDROCHLORIDE 50 MILLIGRAM(S): 50 TABLET ORAL at 10:44

## 2022-02-24 RX ADMIN — Medication 650 MILLIGRAM(S): at 00:24

## 2022-02-24 NOTE — CONSULT NOTE ADULT - ASSESSMENT
76y Female s/p left knee uni arthroplasty    s/p left knee uni arthroplasty POD 1  Management per ortho  PT/OT  incentive spirometry  Pain control  Bowel regiment  DVT ppx    Acute blood loss anemia secondary to post-op bleeding  hemodynamically stable    Chronic problems  Continue home medications

## 2022-02-24 NOTE — CONSULT NOTE ADULT - CONSULT REASON
80 yo male w cholestatic hepatitis w pericholecystic fluid and GB sludge, high fevers, w elevated PT and INR, elev lactate, w ARF, hyponatremia, w hematuria, pancytopenia, but not neutropenic no biliary obstruction on CT scan, neg HIDA scan, splenic infarct on CT,  Blood Cx sent, urine Cx sent. start empiric Doxy and ZOSYN  differential diagnoses include infectious , auto-immune, neoplastic etiologies. Gi and ID ,renal and Heme on board. Echo pending. Hep A/B/C neg, auto mitochondrial and smooth muscle AB neg   DDx ID list: parvovirus, infectious hepatits, CMV/EBV, rickestial dz, ehrlichia, babesia, leptospirosis. PMD reported outptn ferritin level is nearly 30,000, Need to R/O HLH, will need a Bone Marrow Bx. DVT ppx w PAS 2/2 elevated INR. Medical management 78 yo male w cholestatic hepatitis w pericholecystic fluid and GB sludge, high fevers, w elevated PT and INR, elev lactate, w ARF, hyponatremia, w hematuria, pancytopenia, but not neutropenic no biliary obstruction on CT scan, neg HIDA scan, splenic infarct on CT,  Blood Cx sent, urine Cx sent. start empiric Doxy and ZOSYN. ptn presented w SIRS which now has resolved.  differential diagnoses include infectious , auto-immune, neoplastic etiologies. Gi and ID ,renal and Heme on board. Echo pending. Hep A/B/C neg, auto mitochondrial and smooth muscle AB neg   DDx ID list: parvovirus, infectious hepatits, CMV/EBV, rickestial dz, ehrlichia, babesia, leptospirosis. PMD reported outptn ferritin level is nearly 30,000, Need to R/O HLH, will need a Bone Marrow Bx. DVT ppx w PAS 2/2 elevated INR.

## 2022-02-24 NOTE — DISCHARGE NOTE NURSING/CASE MANAGEMENT/SOCIAL WORK - PATIENT PORTAL LINK FT
You can access the FollowMyHealth Patient Portal offered by [facility] by registering at the following website: [email or web address]. By joining [email or web address]’s FollowMyHealth portal, you will also be able to view your health information using other applications (apps) compatible with our system.

## 2022-02-24 NOTE — OCCUPATIONAL THERAPY INITIAL EVALUATION ADULT - TRANSFER SAFETY CONCERNS NOTED: TUB, REHAB EVAL
As discussed with patient, to have  present during tub transfer to increase safety. Pt demonstrated good understanding and in agreement. Pt stated that she owns and was using grab bar and shower chair PTA.

## 2022-02-24 NOTE — OCCUPATIONAL THERAPY INITIAL EVALUATION ADULT - REHAB POTENTIAL, OT EVAL
Pt demonstrated good understanding of home safety, adaptive equipment, and safe car transfer./good, to achieve stated therapy goals

## 2022-02-24 NOTE — DISCHARGE NOTE NURSING/CASE MANAGEMENT/SOCIAL WORK - NSDCPEFALRISK_GEN_ALL_CORE
For information on Fall & Injury Prevention, visit: [email or web address] OR  [email or web address] OR  https://www.cdc.gov/steadi/patient.html

## 2022-02-24 NOTE — PROGRESS NOTE ADULT - SUBJECTIVE AND OBJECTIVE BOX
UNI  ORTHOPEDIC POST OP CHECK    T(C): 36.1 (02-23-22 @ 17:05), Max: 36.5 (02-23-22 @ 08:29)  HR: 78 (02-23-22 @ 17:05) (63 - 89)  BP: 134/63 (02-23-22 @ 17:05) (107/64 - 140/67)  RR: 18 (02-23-22 @ 17:05) (14 - 19)  SpO2: 97% (02-23-22 @ 16:04) (96% - 100%)      PREOP HGB 13.1          S/P TKA ARTHROPLASTY  PAIN CONTROLLED  VSS   A&O X3  DRESSING C/D/I  NVI  ANTIBIOTICS INFUSED  DVT PROPHYLAXIS ORDERED  ENCOURAGE INCENTIVE SPIROMETRY  AM LABS  REHAB TO BEGIN POD0   D/C PLANNING    
ORTHOPEDIC SURGERY PROGRESS NOTE:    HARINDER ALVARADO  [id number]    76y Female s/p left knee uni arthroplasty POD#1 . Patient seen and examined this morning at bedside, doing well, pain well controlled. No overnight events. Patient worked with PT post operatively. Denies any numbness/tingling in the extremities, denies CP/SOB/N/V/D.       T(F): 96.5 (02-24-22 @ 05:23), Max: 97.7 (02-23-22 @ 08:29)  HR: 66 (02-24-22 @ 05:23) (63 - 89)  BP: 119/62 (02-24-22 @ 05:23) (107/64 - 140/67)  RR: 18 (02-24-22 @ 05:23) (14 - 19)  SpO2: 97% (02-23-22 @ 16:04) (96% - 100%)    PHYSICAL EXAM:   Patient laying in bed, in NAD, unlabored breathing on RA     LLE:  Dressing in place c/d/i  SILT sp/dp/t/sural/saph  Firing ta/ehl/fhl/gs  compartments soft and compressible  Foot WWP, 2+ DP pulse      LABS:                        11.0   8.95  )-----------( 216      ( 24 Feb 2022 07:01 )             33.4   02-24    140  |  109  |  10  ----------------------------<  100<H>  4.4   |  23  |  0.6<L>    Ca    8.6      24 Feb 2022 07:01        A/P: 76y Female s/p left knee uni arthroplasty POD # 1  -Weight Bearing Status: wbat  -Pain control  -DVT ppx  -Incentive spirometry  -PT/OT  -post op antibiotics   -Dispo planning: home with home PT

## 2022-02-24 NOTE — CONSULT NOTE ADULT - SUBJECTIVE AND OBJECTIVE BOX
PROGRESS NOTE:   Juan José Forbes MD  Cell [phone]    Patient is a 76y old  Female who presents with a chief complaint of elective left knee uni (24 Feb 2022 08:12)      SUBJECTIVE / OVERNIGHT EVENTS:  s/p  left knee uni   No new complaints.    ADDITIONAL REVIEW OF SYSTEMS:  Denies any fever, chills, fatigue, abdominal pain, n/v/d    MEDICATIONS  (STANDING):  acetaminophen     Tablet .. 650 milliGRAM(s) Oral every 6 hours  aspirin enteric coated 81 milliGRAM(s) Oral two times a day  atorvastatin 10 milliGRAM(s) Oral at bedtime  celecoxib 200 milliGRAM(s) Oral every 12 hours  chlorhexidine 4% Liquid 1 Application(s) Topical <User Schedule>  fluticasone propionate 50 MICROgram(s)/spray Nasal Spray 1 Spray(s) Both Nostrils two times a day  loratadine 10 milliGRAM(s) Oral daily  pantoprazole    Tablet 40 milliGRAM(s) Oral before breakfast  senna 2 Tablet(s) Oral at bedtime  sodium chloride 0.9%. 1000 milliLiter(s) (100 mL/Hr) IV Continuous <Continuous>    MEDICATIONS  (PRN):  ALPRAZolam 0.5 milliGRAM(s) Oral at bedtime PRN sleep  aluminum hydroxide/magnesium hydroxide/simethicone Suspension 30 milliLiter(s) Oral four times a day PRN Indigestion  ondansetron Injectable 4 milliGRAM(s) IV Push every 6 hours PRN Nausea and/or Vomiting  traMADol 50 milliGRAM(s) Oral every 4 hours PRN Mild Pain (1 - 3)      CAPILLARY BLOOD GLUCOSE      POCT Blood Glucose.: 90 mg/dL (24 Feb 2022 07:41)  POCT Blood Glucose.: 91 mg/dL (23 Feb 2022 13:21)    I&O's Summary    23 Feb 2022 07:01  -  24 Feb 2022 07:00  --------------------------------------------------------  IN: 560 mL / OUT: 1650 mL / NET: -1090 mL    24 Feb 2022 07:01  -  24 Feb 2022 12:29  --------------------------------------------------------  IN: 0 mL / OUT: 300 mL / NET: -300 mL        PHYSICAL EXAM:  Vital Signs Last 24 Hrs  T(C): 35.7 (24 Feb 2022 09:00), Max: 36.4 (23 Feb 2022 13:17)  T(F): 96.3 (24 Feb 2022 09:00), Max: 97.5 (23 Feb 2022 13:17)  HR: 80 (24 Feb 2022 09:00) (63 - 89)  BP: 141/75 (24 Feb 2022 09:00) (107/64 - 141/75)  BP(mean): --  RR: 18 (24 Feb 2022 09:00) (14 - 19)  SpO2: 97% (23 Feb 2022 16:04) (96% - 100%)    GENERAL: No acute distress, well-developed  HEAD:  Atraumatic, Normocephalic  EYES: EOMI, PERRLA, conjunctiva and sclera clear  NECK: Supple, no lymphadenopathy, no JVD  CHEST/LUNG: CTAB; No wheezes, rales, or rhonchi  HEART: Regular rate and rhythm; No murmurs, rubs, or gallops  ABDOMEN: Soft, non-tender, non-distended; normal bowel sounds, no organomegaly  EXTREMITIES:  Dressing in place c/d/i  NEUROLOGY: A&O x 3, no focal deficits  SKIN: No rashes or lesions    LABS:                        11.0   8.95  )-----------( 216      ( 24 Feb 2022 07:01 )             33.4     02-24    140  |  109  |  10  ----------------------------<  100<H>  4.4   |  23  |  0.6<L>    Ca    8.6      24 Feb 2022 07:01                  RADIOLOGY & ADDITIONAL TESTS:  Results Reviewed:   Imaging Personally Reviewed:  Electrocardiogram Personally Reviewed:    COORDINATION OF CARE:  Care Discussed with Consultants/Other Providers [Y/N]:  Prior or Outpatient Records Reviewed [Y/N]:   Juan José Forbes MD  Cell [phone]    Patient is a 76y old  Female who presents with a chief complaint of elective left knee uni (24 Feb 2022 08:12)      SUBJECTIVE / OVERNIGHT EVENTS:  s/p  left knee uni   No new complaints.    ADDITIONAL REVIEW OF SYSTEMS:  Denies any fever, chills, fatigue, abdominal pain, n/v/d    MEDICATIONS  (STANDING):  acetaminophen     Tablet .. 650 milliGRAM(s) Oral every 6 hours  aspirin enteric coated 81 milliGRAM(s) Oral two times a day  atorvastatin 10 milliGRAM(s) Oral at bedtime  celecoxib 200 milliGRAM(s) Oral every 12 hours  chlorhexidine 4% Liquid 1 Application(s) Topical <User Schedule>  fluticasone propionate 50 MICROgram(s)/spray Nasal Spray 1 Spray(s) Both Nostrils two times a day  loratadine 10 milliGRAM(s) Oral daily  pantoprazole    Tablet 40 milliGRAM(s) Oral before breakfast  senna 2 Tablet(s) Oral at bedtime  sodium chloride 0.9%. 1000 milliLiter(s) (100 mL/Hr) IV Continuous <Continuous>    MEDICATIONS  (PRN):  ALPRAZolam 0.5 milliGRAM(s) Oral at bedtime PRN sleep  aluminum hydroxide/magnesium hydroxide/simethicone Suspension 30 milliLiter(s) Oral four times a day PRN Indigestion  ondansetron Injectable 4 milliGRAM(s) IV Push every 6 hours PRN Nausea and/or Vomiting  traMADol 50 milliGRAM(s) Oral every 4 hours PRN Mild Pain (1 - 3)      CAPILLARY BLOOD GLUCOSE      POCT Blood Glucose.: 90 mg/dL (24 Feb 2022 07:41)  POCT Blood Glucose.: 91 mg/dL (23 Feb 2022 13:21)    I&O's Summary    23 Feb 2022 07:01  -  24 Feb 2022 07:00  --------------------------------------------------------  IN: 560 mL / OUT: 1650 mL / NET: -1090 mL    24 Feb 2022 07:01  -  24 Feb 2022 12:29  --------------------------------------------------------  IN: 0 mL / OUT: 300 mL / NET: -300 mL        PHYSICAL EXAM:  Vital Signs Last 24 Hrs  T(C): 35.7 (24 Feb 2022 09:00), Max: 36.4 (23 Feb 2022 13:17)  T(F): 96.3 (24 Feb 2022 09:00), Max: 97.5 (23 Feb 2022 13:17)  HR: 80 (24 Feb 2022 09:00) (63 - 89)  BP: 141/75 (24 Feb 2022 09:00) (107/64 - 141/75)  BP(mean): --  RR: 18 (24 Feb 2022 09:00) (14 - 19)  SpO2: 97% (23 Feb 2022 16:04) (96% - 100%)    GENERAL: No acute distress, well-developed  HEAD:  Atraumatic, Normocephalic  EYES: EOMI, PERRLA, conjunctiva and sclera clear  NECK: Supple, no lymphadenopathy, no JVD  CHEST/LUNG: CTAB; No wheezes, rales, or rhonchi  HEART: Regular rate and rhythm; No murmurs, rubs, or gallops  ABDOMEN: Soft, non-tender, non-distended; normal bowel sounds, no organomegaly  EXTREMITIES:  Dressing in place c/d/i  NEUROLOGY: A&O x 3, no focal deficits  SKIN: No rashes or lesions    LABS:                        11.0   8.95  )-----------( 216      ( 24 Feb 2022 07:01 )             33.4     02-24    140  |  109  |  10  ----------------------------<  100<H>  4.4   |  23  |  0.6<L>    Ca    8.6      24 Feb 2022 07:01                  RADIOLOGY & ADDITIONAL TESTS:  Results Reviewed:   Imaging Personally Reviewed:  Electrocardiogram Personally Reviewed:    COORDINATION OF CARE:  Care Discussed with Consultants/Other Providers [Y/N]:  Prior or Outpatient Records Reviewed [Y/N]:

## 2022-02-24 NOTE — OCCUPATIONAL THERAPY INITIAL EVALUATION ADULT - LIVES WITH, PROFILE
lives with  in an apartment +5 steps to enter with bilateral handrails, no further stairs inside +tub +grab bars +shower chair/spouse

## 2022-02-24 NOTE — OCCUPATIONAL THERAPY INITIAL EVALUATION ADULT - GENERAL OBSERVATIONS, REHAB EVAL
10:00-10:24; chart reviewed, ok to treat by Occupational Therapist as confirmed by RN Jaye, Pt received seated in bedside chair +acewrap left knee +aquacel left knee in NAD. Pt reported 2/10 pain left knee, RN made aware. Pt in agreement with OT IE.

## 2022-02-25 LAB — SURGICAL PATHOLOGY STUDY: SIGNIFICANT CHANGE UP

## 2022-03-29 DIAGNOSIS — M17.12 UNILATERAL PRIMARY OSTEOARTHRITIS, LEFT KNEE: ICD-10-CM

## 2022-03-29 PROBLEM — K21.9 GASTRO-ESOPHAGEAL REFLUX DISEASE WITHOUT ESOPHAGITIS: Chronic | Status: ACTIVE | Noted: 2022-02-08

## 2022-03-29 PROBLEM — F41.9 ANXIETY DISORDER, UNSPECIFIED: Chronic | Status: ACTIVE | Noted: 2022-02-08

## 2022-03-29 PROBLEM — E78.00 PURE HYPERCHOLESTEROLEMIA, UNSPECIFIED: Chronic | Status: ACTIVE | Noted: 2022-02-08

## 2022-03-29 PROBLEM — C18.9 MALIGNANT NEOPLASM OF COLON, UNSPECIFIED: Chronic | Status: ACTIVE | Noted: 2022-02-08

## 2022-11-15 ENCOUNTER — APPOINTMENT (OUTPATIENT)
Dept: ORTHOPEDIC SURGERY | Facility: CLINIC | Age: 77
End: 2022-11-15

## 2022-11-15 PROCEDURE — 99213 OFFICE O/P EST LOW 20 MIN: CPT

## 2022-11-15 NOTE — HISTORY OF PRESENT ILLNESS
[de-identified] : f/u of left knee uni\par has very mild pain posteriorly\par no swelling\par well healed incision\par excellent ROM\par no instability\par no lateral pain\par \par plan:\par celebrex QD for 2 weeks\par f/u in february\par

## 2023-02-09 ENCOUNTER — APPOINTMENT (OUTPATIENT)
Dept: ORTHOPEDIC SURGERY | Facility: CLINIC | Age: 78
End: 2023-02-09
Payer: MEDICARE

## 2023-02-09 PROCEDURE — 99024 POSTOP FOLLOW-UP VISIT: CPT

## 2023-02-09 NOTE — HISTORY OF PRESENT ILLNESS
[de-identified] : 77-year-old lady here for follow-up of her left knee.  He had the partial replacement done, overall doing very nicely, no pain.  Has excellent range of motion.  Able to go up and down stairs and walk nicely, the only thing that she notes to me on today's visit is that she hears some clicking in the knee, the clicking was reproducible on the exam, it appears to be normal in keeping with her replacement.\par If becomes bothersome she will see me back, otherwise she will continue activities as tolerated and follow-up with me on an as-needed basis.

## 2023-04-13 NOTE — H&P PST ADULT - VISION (WITH CORRECTIVE LENSES IF THE PATIENT USUALLY WEARS THEM):
Detail Level: Detailed Render Note In Bullet Format When Appropriate: No Consent: The patient's consent was obtained including but not limited to risks of crusting, scabbing, blistering, scarring, darker or lighter pigmentary change, recurrence, incomplete removal and infection. Show Applicator Variable?: Yes Medical Necessity Information: It is in your best interest to select a reason for this procedure from the list below. All of these items fulfill various CMS LCD requirements except the new and changing color options. Spray Paint Text: The liquid nitrogen was applied to the skin utilizing a spray paint frosting technique. Post-Care Instructions: I reviewed with the patient in detail post-care instructions. Patient is to wear sunprotection, and avoid picking at any of the treated lesions. Pt may apply Vaseline to crusted or scabbing areas. Medical Necessity Clause: This procedure was medically necessary because the lesions that were treated were: symptomatic and negatively impacting patient Normal vision: sees adequately in most situations; can see medication labels, newsprint

## 2023-06-20 ENCOUNTER — APPOINTMENT (OUTPATIENT)
Dept: ORTHOPEDIC SURGERY | Facility: CLINIC | Age: 78
End: 2023-06-20

## 2023-11-03 NOTE — DISCHARGE NOTE NURSING/CASE MANAGEMENT/SOCIAL WORK - FLU SEASON?
I am a RN care manager with [facility] population health team. We outreach telephonically to high risk patients for 3-6 mo with goal of improving self-management. Please let me know if there are specific things you would like me to focus on with Karishma.      Karishma and I reviewed medications today, medication list updated. Did not review vitamins per pt preference. Karishma had these questions:   • Karishma is taking atorvastatin and 81 mg ASA since 10/03 hospitalization w/ TIA, but is out of any other medications they prescribed (she forgot name, I see Plavix in DC note). She wonders if these prescriptions were meant to be for 3 weeks or long-term?   • Karishma has 50 mg tablets of the metoprolol, not 25 mg per prescription. She has been taking a 0.5 tablet (25 mg) BID. 06/14 PCP note advised 25 mg BID, 09/12 PCP note advised 12.5 mg BID. Takes BP sporadically, no readings available today.  • Karishma wonders why she is on the atorvastatin if her cholesterol has been controlled? I explained it may be for stroke prevention.   • Karishma had leftover Januvia from prior prescription, just ran out of it today. She wonders if PCP would restart the Januvia if she found a financial assistance program for it?   • Karishma was told by pharmacy team member that her Fosamax would need to be at least 70 mg to be effective, otherwise works as well to take calcium (she is on 500 iu calcium concentrate plus D3 BID). She wonders if this is true?      Please contact patient if you have recommendations on above, or if you prefer she make an appt to discuss.      Thank you,   PRATIK Jacobs [phone]  Out of Office 11/02-11/06     I left message for Karishma to please bring her medications in to the office with her.   She is to get a 25 mg Metoprolol and take 1/2 a tab BID    Hospital discharge instructions: on 10/3/2023  START taking these medications     aspirin 81 MG chew tablet  Commonly known as: Aspirin  Take 1 (one) tablet by mouth once daily for 30 days  Start  taking on: October 5, 2023    atorvastatin 20 MG tablet  Commonly known as: Lipitor  Take 1 (one) tablet by mouth once daily    clopidogrel 75 MG tablet  Commonly known as: plaVIX  Take 1 (one) tablet by mouth once daily for 19 days  Start taking on: October 5, 2023    Atorvastatin  listed as an allergy-myalgias. If she is tolerating medication, I will remove it from her allergy list.     For diabetes we have metformin, glipizide and farxiga. The januvia was stopped R/T cost.    Her alendronate was increased to 35 mg 1 days a week in 6/2023.   After microalbumin is completed, may increase dose, but she is supplementing with calcium.        Yes...

## 2023-11-28 ENCOUNTER — OUTPATIENT (OUTPATIENT)
Dept: OUTPATIENT SERVICES | Facility: HOSPITAL | Age: 78
LOS: 1 days | End: 2023-11-28
Payer: MEDICARE

## 2023-11-28 DIAGNOSIS — Z00.8 ENCOUNTER FOR OTHER GENERAL EXAMINATION: ICD-10-CM

## 2023-11-28 DIAGNOSIS — Z13.820 ENCOUNTER FOR SCREENING FOR OSTEOPOROSIS: ICD-10-CM

## 2023-11-28 DIAGNOSIS — Z90.49 ACQUIRED ABSENCE OF OTHER SPECIFIED PARTS OF DIGESTIVE TRACT: Chronic | ICD-10-CM

## 2023-11-28 DIAGNOSIS — Z98.890 OTHER SPECIFIED POSTPROCEDURAL STATES: Chronic | ICD-10-CM

## 2023-11-28 PROCEDURE — 77080 DXA BONE DENSITY AXIAL: CPT

## 2023-11-29 DIAGNOSIS — Z13.820 ENCOUNTER FOR SCREENING FOR OSTEOPOROSIS: ICD-10-CM

## 2025-09-11 ENCOUNTER — APPOINTMENT (OUTPATIENT)
Facility: CLINIC | Age: 80
End: 2025-09-11
Payer: MEDICARE

## 2025-09-11 DIAGNOSIS — M70.62 TROCHANTERIC BURSITIS, LEFT HIP: ICD-10-CM

## 2025-09-11 PROCEDURE — 73502 X-RAY EXAM HIP UNI 2-3 VIEWS: CPT

## 2025-09-11 PROCEDURE — 99213 OFFICE O/P EST LOW 20 MIN: CPT
